# Patient Record
Sex: FEMALE | Race: WHITE | NOT HISPANIC OR LATINO | URBAN - METROPOLITAN AREA
[De-identification: names, ages, dates, MRNs, and addresses within clinical notes are randomized per-mention and may not be internally consistent; named-entity substitution may affect disease eponyms.]

---

## 2017-04-12 ENCOUNTER — APPOINTMENT (OUTPATIENT)
Age: 42
Setting detail: DERMATOLOGY
End: 2017-04-12

## 2017-04-12 DIAGNOSIS — L81.4 OTHER MELANIN HYPERPIGMENTATION: ICD-10-CM

## 2017-04-12 DIAGNOSIS — Z41.9 ENCOUNTER FOR PROCEDURE FOR PURPOSES OTHER THAN REMEDYING HEALTH STATE, UNSPECIFIED: ICD-10-CM

## 2017-04-12 PROCEDURE — 99213 OFFICE O/P EST LOW 20 MIN: CPT

## 2017-04-12 PROCEDURE — OTHER PRODUCT LINE (HYPERPIGMENTATION): OTHER

## 2017-04-12 PROCEDURE — OTHER PRODUCT LINE (ANTI-AGING): OTHER

## 2017-04-12 PROCEDURE — OTHER TREATMENT REGIMEN: OTHER

## 2017-04-12 PROCEDURE — OTHER COUNSELING: OTHER

## 2017-04-12 ASSESSMENT — LOCATION DETAILED DESCRIPTION DERM: LOCATION DETAILED: RIGHT INFERIOR MEDIAL FOREHEAD

## 2017-04-12 ASSESSMENT — LOCATION ZONE DERM: LOCATION ZONE: FACE

## 2017-04-12 ASSESSMENT — LOCATION SIMPLE DESCRIPTION DERM: LOCATION SIMPLE: RIGHT FOREHEAD

## 2017-04-12 NOTE — PROCEDURE: PRODUCT LINE (HYPERPIGMENTATION)
Product 1 Application Directions: Apply to brown spots on face BID\\n\\n\\nLot#: [id number]\\nExp: 9/16/17 Product 1 Application Directions: Apply to brown spots on face BID\\n\\n\\nLot#: [id number]\\nExp: 9/16/17

## 2017-04-12 NOTE — PROCEDURE: TREATMENT REGIMEN
Modify Regimen: Instructed to use Neutrogena Gentle facial cleanser and Neutrogena Hydroboost gel/cream for at night
Discontinue Regimen: Charcoal cleanser
Detail Level: Zone
Initiate Treatment: \\n\\nApply the Melasma Emulsion 8% cream to face at bedtime. Start every other night at bedtime and advance to nightly as tolerated
Initiate Treatment: .
Samples Given: Neutrogena Hydroboost gel/cream and Gentle cleanser

## 2017-04-12 NOTE — PROCEDURE: PRODUCT LINE (HYPERPIGMENTATION)
Product 3 Application Directions: Apply a pea sized amount to the entire face at bedtime\\n\\nLot: [id number]\\nExp: 08/22/2017 Product 3 Application Directions: Apply a pea sized amount to the entire face at bedtime\\n\\nLot: [id number]\\nExp: 08/22/2017

## 2017-04-12 NOTE — PROCEDURE: PRODUCT LINE (ANTI-AGING)
Product 16 Price (In Dollars - Numeric Only, No Special Characters Or $): 0.00
Product 71 Units: 0
Render Product Pricing In Note: Yes
Product 2 Application Directions: Apply a pea-size amount to face at bedtime
Name Of Product 3: Retinoic acid 0.1% cream
Product 1 Units: 1
Product 3 Price (In Dollars - Numeric Only, No Special Characters Or $): 60.00
Detail Level: Zone
Product 3 Application Directions: Apply a pea-size amount to face at bedtime \\n\\nLot:[id number]\\nExp: 5/5/2017
Product 1 Price (In Dollars - Numeric Only, No Special Characters Or $): 55.00
Product 1 Application Directions: Apply to the eyelids at bedtime
Name Of Product 2: Retinoic acid 0.05% cream
Product 2 Price (In Dollars - Numeric Only, No Special Characters Or $): 40.00
Name Of Product 1: Renew Eye gel

## 2017-04-12 NOTE — HPI: DISCOLORATION
How Severe Is Your Skin Discoloration?: mild
Additional History: Patient was using the Advance Bleaching cream from our office, but is completely out. She is requesting a refill. \\nShe would also like to purchase the EltaMD Eye cream.

## 2017-04-12 NOTE — PROCEDURE: PRODUCT LINE (HYPERPIGMENTATION)
Product 2 Application Directions: Apply 1-2 pumps once daily at bedtime to affected areas on face. \\nLOT #: [id number]\\nExp date: 09/08/2017 Product 2 Application Directions: Apply 1-2 pumps once daily at bedtime to affected areas on face. \\nLOT #: [id number]\\nExp date: 09/08/2017